# Patient Record
Sex: MALE | Race: WHITE | Employment: FULL TIME
[De-identification: names, ages, dates, MRNs, and addresses within clinical notes are randomized per-mention and may not be internally consistent; named-entity substitution may affect disease eponyms.]

---

## 2019-10-03 ENCOUNTER — HEALTH MAINTENANCE LETTER (OUTPATIENT)
Age: 39
End: 2019-10-03

## 2020-11-07 ENCOUNTER — HEALTH MAINTENANCE LETTER (OUTPATIENT)
Age: 40
End: 2020-11-07

## 2021-09-05 ENCOUNTER — HEALTH MAINTENANCE LETTER (OUTPATIENT)
Age: 41
End: 2021-09-05

## 2021-12-26 ENCOUNTER — HEALTH MAINTENANCE LETTER (OUTPATIENT)
Age: 41
End: 2021-12-26

## 2022-10-23 ENCOUNTER — HEALTH MAINTENANCE LETTER (OUTPATIENT)
Age: 42
End: 2022-10-23

## 2022-12-15 DIAGNOSIS — Z84.89 FAMILY HISTORY OF GENETIC DISEASE: Primary | ICD-10-CM

## 2022-12-15 NOTE — PROGRESS NOTES
Order placed for Ashok for familial variant testing. 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     ABCA4 c.1622T>C (p.Azg144Iff)   ABCA4 c.3113C>T (p.Tgf4609Xzb)  ABCA4 c.5512C>G (p.Fay5901Pnw)   EEW80Y0 c.-608_-489del    We discussed testing for these variants for Ashok to further aid in Dwain's genetic testing interpretation and to confirm carrier status. He will be mailed a buccal kit for sample collection and I will call with results 2-3 weeks after the lab receives his sample.     Shahnaz Villalta MS, [facility]  Licensed Genetic Counselor  [facility]  Phone: [phone]  Fax: [phone]

## 2023-04-02 ENCOUNTER — HEALTH MAINTENANCE LETTER (OUTPATIENT)
Age: 43
End: 2023-04-02

## 2024-06-02 ENCOUNTER — HEALTH MAINTENANCE LETTER (OUTPATIENT)
Age: 44
End: 2024-06-02

## 2024-08-07 NOTE — PROGRESS NOTES
AUDIOLOGY REPORT    SUMMARY: [facility] Audiology visit (baseline) completed. See audiogram for results.      RECOMMENDATIONS: Follow-up with Dr. Nicho Quinones. Recheck hearing if changes.     The patient expressed understanding and agreement with this plan.    Rashaad Lo, CCC-A, [facility]  Licensed Audiologist  [place] #[id number]

## 2024-08-12 ENCOUNTER — VIRTUAL VISIT (OUTPATIENT)
Dept: INTERNAL MEDICINE | Facility: CLINIC | Age: 44
End: 2024-08-12
Payer: COMMERCIAL

## 2024-08-12 DIAGNOSIS — Z00.00 VISIT FOR PREVENTIVE HEALTH EXAMINATION: ICD-10-CM

## 2024-08-12 DIAGNOSIS — Z00.00 ENCOUNTER FOR PREVENTIVE HEALTH EXAMINATION: Primary | ICD-10-CM

## 2024-08-12 PROCEDURE — 99207 PR NO CHARGE LOS: CPT | Mod: 93

## 2024-08-12 RX ORDER — ERGOCALCIFEROL (VITAMIN D2) 10 MCG
TABLET ORAL
COMMUNITY

## 2024-08-12 ASSESSMENT — ANXIETY QUESTIONNAIRES
GAD7 TOTAL SCORE: 0
2. NOT BEING ABLE TO STOP OR CONTROL WORRYING: NOT AT ALL
4. TROUBLE RELAXING: NOT AT ALL
GAD7 TOTAL SCORE: 0
3. WORRYING TOO MUCH ABOUT DIFFERENT THINGS: NOT AT ALL
7. FEELING AFRAID AS IF SOMETHING AWFUL MIGHT HAPPEN: NOT AT ALL
GAD7 TOTAL SCORE: 0
7. FEELING AFRAID AS IF SOMETHING AWFUL MIGHT HAPPEN: NOT AT ALL
1. FEELING NERVOUS, ANXIOUS, OR ON EDGE: NOT AT ALL
6. BECOMING EASILY ANNOYED OR IRRITABLE: NOT AT ALL
GAD7 TOTAL SCORE: 0
5. BEING SO RESTLESS THAT IT IS HARD TO SIT STILL: NOT AT ALL
4. TROUBLE RELAXING: NOT AT ALL
7. FEELING AFRAID AS IF SOMETHING AWFUL MIGHT HAPPEN: NOT AT ALL
3. WORRYING TOO MUCH ABOUT DIFFERENT THINGS: NOT AT ALL
2. NOT BEING ABLE TO STOP OR CONTROL WORRYING: NOT AT ALL
5. BEING SO RESTLESS THAT IT IS HARD TO SIT STILL: NOT AT ALL
6. BECOMING EASILY ANNOYED OR IRRITABLE: NOT AT ALL
7. FEELING AFRAID AS IF SOMETHING AWFUL MIGHT HAPPEN: NOT AT ALL
GAD7 TOTAL SCORE: 0
GAD7 TOTAL SCORE: 0
1. FEELING NERVOUS, ANXIOUS, OR ON EDGE: NOT AT ALL

## 2024-08-12 NOTE — PROGRESS NOTES
Health Maintenance:  Do you have a PCP? No  When was your last visit with your PCP?   When was your last eye exam? 10 years  Have you ever had a colonoscopy? No Flex sig  If yes, when? 3 years  Have you ever had any polyps removed? No    As part of your visit we will set up a DEXA scan which will measure your body composition. We have a few questions that need to be answered before we can schedule this scan:   What is your approximate weight? 170   Have you ever had a DEXA scan within the past 2 years? No   Will you have any other imaging studies with contrast (x-ray, CT scan) within 7 days of this appointment? No   Have you had any spine or hip surgery? No   Do you take any vitamins that contain calcium or antacids with calcium? Yes    If yes, stop taking 24 hours prior to visit.     Goals for the Visit:  Thorough Comprehensive Preventive Exam    Pertinent past Medical/Family and Social HX:   Pertinent sx that desire are addressed with this visit:     Answers submitted by the patient for this visit:  EDUARDO-7 (Submitted on 8/12/2024)  EDUARDO 7 TOTAL SCORE: 0    Instructions prior to appointment:   1. Fast beginning at 10 pm for lab appointment  2. If your preventive care assessment package includes a Fitness Assessment, please bring athletic shoes. Complementary [employer] [facility] fitness attire is provided and yours to keep.  3. If eye exam, eyes may be dilated, it will last 4-6 hours, may want to bring sunglasses.   4. May bring laptop or other work materials for use during downtime.   5. You will receive an email about 3 days prior to your visit with a final itinerary, menu selections for the complementary breakfast and lunch and instructions for the visit.     Complimentary  Parking provided. Drop off car in front of [facility], take the patient elevators to the [facility] clinic. When you enter in the lobby, identify yourself as an [facility] [atient and you  will be escorted up to the clinic.   If questions arise prior to your appointment please contact the clinic at [phone].

## 2024-08-15 ENCOUNTER — OFFICE VISIT (OUTPATIENT)
Dept: OPHTHALMOLOGY | Facility: CLINIC | Age: 44
End: 2024-08-15
Payer: COMMERCIAL

## 2024-08-15 ENCOUNTER — OFFICE VISIT (OUTPATIENT)
Dept: INTERNAL MEDICINE | Facility: CLINIC | Age: 44
End: 2024-08-15
Payer: COMMERCIAL

## 2024-08-15 ENCOUNTER — OFFICE VISIT (OUTPATIENT)
Dept: PULMONOLOGY | Facility: CLINIC | Age: 44
End: 2024-08-15
Payer: COMMERCIAL

## 2024-08-15 ENCOUNTER — OFFICE VISIT (OUTPATIENT)
Dept: AUDIOLOGY | Facility: CLINIC | Age: 44
End: 2024-08-15
Payer: COMMERCIAL

## 2024-08-15 ENCOUNTER — OFFICE VISIT (OUTPATIENT)
Dept: GASTROENTEROLOGY | Facility: CLINIC | Age: 44
End: 2024-08-15
Payer: COMMERCIAL

## 2024-08-15 ENCOUNTER — OFFICE VISIT (OUTPATIENT)
Dept: DERMATOLOGY | Facility: CLINIC | Age: 44
End: 2024-08-15
Payer: COMMERCIAL

## 2024-08-15 ENCOUNTER — ANCILLARY PROCEDURE (OUTPATIENT)
Dept: BONE DENSITY | Facility: CLINIC | Age: 44
End: 2024-08-15
Payer: COMMERCIAL

## 2024-08-15 ENCOUNTER — APPOINTMENT (OUTPATIENT)
Dept: INTERNAL MEDICINE | Facility: CLINIC | Age: 44
End: 2024-08-15
Payer: COMMERCIAL

## 2024-08-15 VITALS
WEIGHT: 170 LBS | RESPIRATION RATE: 16 BRPM | SYSTOLIC BLOOD PRESSURE: 125 MMHG | HEART RATE: 65 BPM | HEIGHT: 68 IN | DIASTOLIC BLOOD PRESSURE: 83 MMHG | TEMPERATURE: 97.7 F | OXYGEN SATURATION: 99 % | BODY MASS INDEX: 25.76 KG/M2

## 2024-08-15 DIAGNOSIS — Z00.00 ENCOUNTER FOR PREVENTIVE HEALTH EXAMINATION: ICD-10-CM

## 2024-08-15 DIAGNOSIS — K64.9 HEMORRHOIDS, UNSPECIFIED HEMORRHOID TYPE: Primary | ICD-10-CM

## 2024-08-15 DIAGNOSIS — Z00.00 VISIT FOR PREVENTIVE HEALTH EXAMINATION: ICD-10-CM

## 2024-08-15 DIAGNOSIS — Z12.83 SKIN CANCER SCREENING: ICD-10-CM

## 2024-08-15 DIAGNOSIS — D22.9 MULTIPLE MELANOCYTIC NEVI: ICD-10-CM

## 2024-08-15 DIAGNOSIS — Z01.00 EXAMINATION OF EYES AND VISION: Primary | ICD-10-CM

## 2024-08-15 DIAGNOSIS — Z71.82 EXERCISE COUNSELING: Primary | ICD-10-CM

## 2024-08-15 DIAGNOSIS — Z01.10 NORMAL HEARING EXAM: Primary | ICD-10-CM

## 2024-08-15 DIAGNOSIS — L21.9 DERMATITIS, SEBORRHEIC: Primary | ICD-10-CM

## 2024-08-15 DIAGNOSIS — Z00.00 ENCOUNTER FOR PREVENTIVE HEALTH EXAMINATION: Primary | ICD-10-CM

## 2024-08-15 DIAGNOSIS — R73.01 IMPAIRED FASTING GLUCOSE: ICD-10-CM

## 2024-08-15 LAB
ACANTHOCYTES BLD QL SMEAR: NORMAL
ALBUMIN UR-MCNC: NEGATIVE MG/DL
ALP SERPL-CCNC: 52 U/L (ref 40–150)
ALT SERPL W P-5'-P-CCNC: 19 U/L (ref 0–70)
APPEARANCE UR: CLEAR
ATRIAL RATE - MUSE: 63 BPM
AUER BODIES BLD QL SMEAR: NORMAL
BASO STIPL BLD QL SMEAR: NORMAL
BASOPHILS # BLD AUTO: 0 10E3/UL (ref 0–0.2)
BASOPHILS NFR BLD AUTO: 1 %
BILIRUB UR QL STRIP: NEGATIVE
BITE CELLS BLD QL SMEAR: NORMAL
BLISTER CELLS BLD QL SMEAR: NORMAL
BURR CELLS BLD QL SMEAR: NORMAL
CHOLEST SERPL-MCNC: 171 MG/DL
COLOR UR AUTO: ABNORMAL
CREAT SERPL-MCNC: 1.04 MG/DL (ref 0.67–1.17)
DACRYOCYTES BLD QL SMEAR: NORMAL
DIASTOLIC BLOOD PRESSURE - MUSE: NORMAL MMHG
EGFRCR SERPLBLD CKD-EPI 2021: 90 ML/MIN/1.73M2
ELLIPTOCYTES BLD QL SMEAR: NORMAL
EOSINOPHIL # BLD AUTO: 0.1 10E3/UL (ref 0–0.7)
EOSINOPHIL NFR BLD AUTO: 2 %
ERYTHROCYTE [DISTWIDTH] IN BLOOD BY AUTOMATED COUNT: 12.7 % (ref 10–15)
FASTING STATUS PATIENT QL REPORTED: ABNORMAL
FASTING STATUS PATIENT QL REPORTED: NORMAL
FRAGMENTS BLD QL SMEAR: NORMAL
GLUCOSE SERPL-MCNC: 109 MG/DL (ref 70–99)
GLUCOSE UR STRIP-MCNC: NEGATIVE MG/DL
HBV CORE AB SERPL QL IA: NONREACTIVE
HBV SURFACE AB SERPL IA-ACNC: <3.5 M[IU]/ML
HBV SURFACE AB SERPL IA-ACNC: NONREACTIVE M[IU]/ML
HCT VFR BLD AUTO: 43.3 % (ref 40–53)
HCV AB SERPL QL IA: NONREACTIVE
HDLC SERPL-MCNC: 61 MG/DL
HGB BLD-MCNC: 14 G/DL (ref 13.3–17.7)
HGB C CRYSTALS: NORMAL
HGB UR QL STRIP: NEGATIVE
HIV 1+2 AB+HIV1 P24 AG SERPL QL IA: NONREACTIVE
HOLD SPECIMEN: NORMAL
HOWELL-JOLLY BOD BLD QL SMEAR: NORMAL
IMM GRANULOCYTES # BLD: 0 10E3/UL
IMM GRANULOCYTES NFR BLD: 0 %
INTERPRETATION ECG - MUSE: NORMAL
KETONES UR STRIP-MCNC: NEGATIVE MG/DL
LDLC SERPL CALC-MCNC: 97 MG/DL
LEUKOCYTE ESTERASE UR QL STRIP: NEGATIVE
LYMPHOCYTES # BLD AUTO: 2.1 10E3/UL (ref 0.8–5.3)
LYMPHOCYTES NFR BLD AUTO: 43 %
MCH RBC QN AUTO: 28.5 PG (ref 26.5–33)
MCHC RBC AUTO-ENTMCNC: 32.3 G/DL (ref 31.5–36.5)
MCV RBC AUTO: 88 FL (ref 78–100)
MONOCYTES # BLD AUTO: 0.5 10E3/UL (ref 0–1.3)
MONOCYTES NFR BLD AUTO: 10 %
MUCOUS THREADS #/AREA URNS LPF: PRESENT /LPF
NEUTROPHILS # BLD AUTO: 2.1 10E3/UL (ref 1.6–8.3)
NEUTROPHILS NFR BLD AUTO: 44 %
NEUTS HYPERSEG BLD QL SMEAR: NORMAL
NITRATE UR QL: NEGATIVE
NONHDLC SERPL-MCNC: 110 MG/DL
NRBC # BLD AUTO: 0 10E3/UL
NRBC BLD AUTO-RTO: 0 /100
P AXIS - MUSE: 36 DEGREES
PH UR STRIP: 5.5 [PH] (ref 5–7)
PLAT MORPH BLD: NORMAL
PLATELET # BLD AUTO: 172 10E3/UL (ref 150–450)
POLYCHROMASIA BLD QL SMEAR: NORMAL
PR INTERVAL - MUSE: 200 MS
PSA SERPL DL<=0.01 NG/ML-MCNC: 0.68 NG/ML (ref 0–2.5)
QRS DURATION - MUSE: 86 MS
QT - MUSE: 422 MS
QTC - MUSE: 431 MS
R AXIS - MUSE: -11 DEGREES
RBC # BLD AUTO: 4.91 10E6/UL (ref 4.4–5.9)
RBC AGGLUT BLD QL: NORMAL
RBC MORPH BLD: NORMAL
RBC URINE: <1 /HPF
ROULEAUX BLD QL SMEAR: NORMAL
SICKLE CELLS BLD QL SMEAR: NORMAL
SMUDGE CELLS BLD QL SMEAR: NORMAL
SP GR UR STRIP: 1.02 (ref 1–1.03)
SPHEROCYTES BLD QL SMEAR: NORMAL
SQUAMOUS EPITHELIAL: <1 /HPF
STOMATOCYTES BLD QL SMEAR: NORMAL
SYSTOLIC BLOOD PRESSURE - MUSE: NORMAL MMHG
T AXIS - MUSE: 11 DEGREES
TARGETS BLD QL SMEAR: NORMAL
TOXIC GRANULES BLD QL SMEAR: NORMAL
TRIGL SERPL-MCNC: 65 MG/DL
TSH SERPL DL<=0.005 MIU/L-ACNC: 1.68 UIU/ML (ref 0.3–4.2)
UROBILINOGEN UR STRIP-MCNC: NORMAL MG/DL
VARIANT LYMPHS BLD QL SMEAR: NORMAL
VENTRICULAR RATE- MUSE: 63 BPM
VIT D+METAB SERPL-MCNC: 24 NG/ML (ref 20–50)
WBC # BLD AUTO: 4.9 10E3/UL (ref 4–11)
WBC URINE: <1 /HPF

## 2024-08-15 PROCEDURE — 92550 TYMPANOMETRY & REFLEX THRESH: CPT | Performed by: AUDIOLOGIST-HEARING AID FITTER

## 2024-08-15 PROCEDURE — 84075 ASSAY ALKALINE PHOSPHATASE: CPT | Performed by: PATHOLOGY

## 2024-08-15 PROCEDURE — 87389 HIV-1 AG W/HIV-1&-2 AB AG IA: CPT | Performed by: INTERNAL MEDICINE

## 2024-08-15 PROCEDURE — 92004 COMPRE OPH EXAM NEW PT 1/>: CPT | Performed by: OPTOMETRIST

## 2024-08-15 PROCEDURE — 99204 OFFICE O/P NEW MOD 45 MIN: CPT | Performed by: DERMATOLOGY

## 2024-08-15 PROCEDURE — 80061 LIPID PANEL: CPT | Performed by: PATHOLOGY

## 2024-08-15 PROCEDURE — 85025 COMPLETE CBC W/AUTO DIFF WBC: CPT | Performed by: PATHOLOGY

## 2024-08-15 PROCEDURE — 99000 SPECIMEN HANDLING OFFICE-LAB: CPT | Performed by: PATHOLOGY

## 2024-08-15 PROCEDURE — 84443 ASSAY THYROID STIM HORMONE: CPT | Performed by: PATHOLOGY

## 2024-08-15 PROCEDURE — 93000 ELECTROCARDIOGRAM COMPLETE: CPT | Performed by: INTERNAL MEDICINE

## 2024-08-15 PROCEDURE — 99207 PR NO CHARGE LOS: CPT

## 2024-08-15 PROCEDURE — 94375 RESPIRATORY FLOW VOLUME LOOP: CPT | Performed by: INTERNAL MEDICINE

## 2024-08-15 PROCEDURE — G0103 PSA SCREENING: HCPCS | Performed by: PATHOLOGY

## 2024-08-15 PROCEDURE — 86704 HEP B CORE ANTIBODY TOTAL: CPT | Performed by: INTERNAL MEDICINE

## 2024-08-15 PROCEDURE — 99207 PR NO BILLABLE SERVICE THIS VISIT: CPT | Performed by: DIETITIAN, REGISTERED

## 2024-08-15 PROCEDURE — 82565 ASSAY OF CREATININE: CPT | Performed by: PATHOLOGY

## 2024-08-15 PROCEDURE — 81001 URINALYSIS AUTO W/SCOPE: CPT | Performed by: PATHOLOGY

## 2024-08-15 PROCEDURE — 86706 HEP B SURFACE ANTIBODY: CPT | Performed by: INTERNAL MEDICINE

## 2024-08-15 PROCEDURE — 77080 DXA BONE DENSITY AXIAL: CPT | Performed by: INTERNAL MEDICINE

## 2024-08-15 PROCEDURE — 82947 ASSAY GLUCOSE BLOOD QUANT: CPT | Performed by: PATHOLOGY

## 2024-08-15 PROCEDURE — 96999 UNLISTED SPEC DERM SVC/PX: CPT | Performed by: INTERNAL MEDICINE

## 2024-08-15 PROCEDURE — 86803 HEPATITIS C AB TEST: CPT | Performed by: INTERNAL MEDICINE

## 2024-08-15 PROCEDURE — 99386 PREV VISIT NEW AGE 40-64: CPT | Performed by: INTERNAL MEDICINE

## 2024-08-15 PROCEDURE — 82306 VITAMIN D 25 HYDROXY: CPT | Performed by: INTERNAL MEDICINE

## 2024-08-15 PROCEDURE — 84460 ALANINE AMINO (ALT) (SGPT): CPT | Performed by: PATHOLOGY

## 2024-08-15 PROCEDURE — 92557 COMPREHENSIVE HEARING TEST: CPT | Performed by: AUDIOLOGIST-HEARING AID FITTER

## 2024-08-15 PROCEDURE — 36415 COLL VENOUS BLD VENIPUNCTURE: CPT | Performed by: PATHOLOGY

## 2024-08-15 RX ORDER — KETOCONAZOLE 20 MG/ML
SHAMPOO TOPICAL
Qty: 120 ML | Refills: 3 | Status: SHIPPED | OUTPATIENT
Start: 2024-08-15

## 2024-08-15 RX ORDER — TRIAMCINOLONE ACETONIDE 1 MG/ML
LOTION TOPICAL
Qty: 60 ML | Refills: 2 | Status: SHIPPED | OUTPATIENT
Start: 2024-08-15

## 2024-08-15 ASSESSMENT — CONF VISUAL FIELD
OS_INFERIOR_NASAL_RESTRICTION: 0
METHOD: COUNTING FINGERS
OD_NORMAL: 1
OD_INFERIOR_NASAL_RESTRICTION: 0
OS_INFERIOR_TEMPORAL_RESTRICTION: 0
OD_SUPERIOR_TEMPORAL_RESTRICTION: 0
OS_SUPERIOR_NASAL_RESTRICTION: 0
OS_NORMAL: 1
OS_SUPERIOR_TEMPORAL_RESTRICTION: 0
OD_INFERIOR_TEMPORAL_RESTRICTION: 0
OD_SUPERIOR_NASAL_RESTRICTION: 0

## 2024-08-15 ASSESSMENT — TONOMETRY
IOP_METHOD: ICARE
OD_IOP_MMHG: 13
OS_IOP_MMHG: 11

## 2024-08-15 ASSESSMENT — CUP TO DISC RATIO
OD_RATIO: 0.35
OS_RATIO: 0.35

## 2024-08-15 ASSESSMENT — SLIT LAMP EXAM - LIDS
COMMENTS: NORMAL
COMMENTS: NORMAL

## 2024-08-15 ASSESSMENT — VISUAL ACUITY
METHOD: SNELLEN - LINEAR
OD_CC+: -2
OS_CC: 20/15
OS_CC+: -2
OD_CC: 20/15

## 2024-08-15 ASSESSMENT — EXTERNAL EXAM - RIGHT EYE: OD_EXAM: NORMAL

## 2024-08-15 ASSESSMENT — PAIN SCALES - GENERAL
PAINLEVEL: NO PAIN (0)
PAINLEVEL: NO PAIN (0)

## 2024-08-15 ASSESSMENT — EXTERNAL EXAM - LEFT EYE: OS_EXAM: NORMAL

## 2024-08-15 NOTE — NURSING NOTE
Dermatology Rooming Note    Ashok Justice's goals for this visit include:   Chief Complaint   Patient presents with    Derm Problem     Had a FBSE a few years ago. Had an area removed on the right forearm. Skin tags on neck. Moles in the groin area.      Ethan Rey, EMT  Clinic Support  [facility]     [phone]    Employed by [employer]

## 2024-08-15 NOTE — PATIENT INSTRUCTIONS
It was nice meeting you today. Below are the nutrition recommendations we discussed at your visit.    Please let me know if you have any additional questions.    Nutrition Recommendations    Increase water to drink at least 64 oz (8 cups) or more per day.    Aim for eating at least 2-3 meals per day. If following an intermittent fasting pattern, recommend eating at least lunch and dinner and if going a long stretch between these 2 meals, have a planned snack/small meal in between these meals.    --okay to replace a meal with a protein drink and can have some vegetables and fruit, either on the side or blended into a protein drink.    --can make some lunch meals ahead of time over the weekend, so you can bring to work during the week for lunch.    --can bring dinner leftovers for lunch at work.    Can use the Plate method plan for general guidance on getting balanced meals and general portion sizes which is as follows:     Make half of your plate vegetables and fruit.      Make 1/4 of your plate lean protein sources at a meal (salmon/fish, skinless chicken/turkey breast, pork tenderloin, lean cuts of beef/93% or leaner beef, beans and legumes such as black/kidney/lemus beans/navy beans, tofu, edamame, tempeh, eggs, egg whites, lowfat cottage cheese, lowfat yogurt).     Make the other 1/4 of your plate at meals a whole grain starches/grains/starchy vegetables. Some examples include quinoa, brown rice, wild rice, barley, whole grain tortilla or starchy vegetables (potatoes, sweet potatoes, winter squash, peas, corn).     Include some healthy/unsaturated fat servings at a meal (olive oil, avocado oil, sesame oil, avocados, natural nut butters, nuts, seeds, olives, flaxseeds, olbo seeds).     *Note: Recommendation is to eat at least 2-3 serving per day of some good sources of calcium (such as lowfat dairy products, (low fat cottage cheese, lowfat yogurt, lowfat milk), tofu, salmon, sardines, kale, spinach, soybeans,  almonds or whey protein powder for example). Also figs, prunes and oranges have some calcium too.    Thank you,    Cinthia Yeh, MS, RD, LD

## 2024-08-15 NOTE — Clinical Note
Audiology evaluation completed: Normal hearing and normal middle ear function in both ears.   Please let me know if questions.  Thanks,  Rashaad Lo, CCC-A, [facility], [place] Licensed Audiologist [place] #[id number]

## 2024-08-15 NOTE — PROGRESS NOTES
A/P  1.) Encounter for eye exam  -Emmetropic, seeing well at distance and near without correction. Asymptomatic for presbyopia  -Dilated ocular health unremarkable OU  -Family hx retinal dystrophy (pt's father and son have it, early onset ~ 20 years). Macula normal OU today. He and his wife have been testing, only have 2 of 3 variations per pt. Would monitor only    Monitor 1-2 years comprehensive, sooner prn    I have confirmed the patient's CC, HPI and reviewed Past Medical History, Past Surgical History, Social History, Family History, Problem List, Medication List and agree with Tech note.     Laura Kaminski, ANDREIA NARANJOO FSLS

## 2024-08-15 NOTE — NURSING NOTE
Chief Complaint   Patient presents with    Physical     Patient is here for annual physical     Bridget Crisostomo CMA 7:24 AM on 8/15/2024

## 2024-08-15 NOTE — PATIENT INSTRUCTIONS
Ashok,    It was great to meet you at [facility]. I especially appreciate your curiosity and questions regarding analyzing your VO2max test, as most patients are not nearly so interested. Your VO2max is about as good it gets in the 95th percentile for men of your age group. Your  strength is in the 35th and 52nd percentiles for your right and left sides, respectively. If you change one thing about your routine, I recommend adding at least one day per week of strength training.    Strength training    I recommend equipment such as kettlebells, an adjustable dumbbell set, and a pull-up bar to use at home. Make sure to get a kettlebell that's not too light, as this lets you overuse your arms and you will fail to develop the proper hip drive. Around 16-20 kg is probably a good starting point.    In addition to a dumbbell set, I recommend a pull-up bar because pulling exercises are often more difficult without gym machines. Be sure to fully secure any pull-up bar.    An adjustable dumbbell set isn't necessary, but it saves quite a bit of space.    Cardiovascular exercise    As stated, your VO2max is fantastic, so if you add cardio I recommend adding moderate or somewhat hard cardio.    Moderate cardio: heart rate 115-130. Go for as long as you like, because you can more or less do this for hours at a time.    Somewhat hard cardio: heart rate 145-155. Go for 20-30 minutes. This should be hard but doable.      Movement during the workday    We didn't have time to cover this, but it's worth mentioning. Recent research has shown that short movement breaks of about 5 minutes at the intensity of a 2 mph walk are far superior to standing. Your blood pressure and blood glucose and insulin control improve even if you take a 5-minute walking break from sitting every 2 hours. You'll receive even greater benefits if you increase this to once every hour and even more if you do this every half hour. Standing instead of some  sitting does something, but it's far better to get up and walk away for a few minutes. Plus, the same research shows that productivity is the same or better with these breaks.      Please reach out with any questions.     Sincerely,    Ancelmo Suggs  PhD, Exercise Physiology

## 2024-08-15 NOTE — NURSING NOTE
AHA BP    1st   131/86  2nd  126/85  3rd   125/83    Average  128/85  Bridget Crisostomo CMA 8:28 AM on 8/15/2024

## 2024-08-15 NOTE — NURSING NOTE
Chief Complaints and History of Present Illnesses   Patient presents with    Annual Eye Exam     Chief Complaint(s) and History of Present Illness(es)       Annual Eye Exam              Laterality: both eyes              Comments    Patient is unsure when his last eye exam was, probably greater than 20 years ago.  Vision seems clear without glasses.  No problems with near vision.  Dad and son have macular degeneration.  Son was diagnosed when he was 9 or 10 years old and sees Dr. Lainez.  No history of diabetes.  Denies flashes or floaters.  Denies eye pain.     Sandra Baker on 8/15/2024 at 8:23 AM

## 2024-08-15 NOTE — PROGRESS NOTES
"[facility] Outpatient Medical Nutrition Therapy      Time Spent:  55 minutes  Session Type:  Initial   Referral Source: [facility]/Dr. Nicho Quinones  Reason for RD Visit:   Nutritional counseling     Nutrition Assessment:    Patient is a 43 y.o. male who is here for initial annual visit with Registered Dietitian (RD). He stated that overall, he feels that he eats a healthy diet. His wife is [place] American so they eat a lot of Japanese dishes at home for dinner. He tends to skip breakfast and lunch many times during the week. He read some about intermittent fasting but also doesn't always want to stop work to go get lunch and also doesn't want to spend money purchasing lunch. At times he brings some snacks to work such as sardines, almonds and pretzels. He doesn't snack daily though. At work, he sips on his mug of americano coffee. He occasionally gets a sparkling water in the afternoon at work but stated the water at work is not good, so doesn't tend to drink water there. He tends to drink his water after work and estimated drinking 32-48 oz water per day. He has a large 64 oz water container that he sometimes brings to work but not regularly. He also has an option to infuse his water with fruit for some flavor.     Patient Active Problem List   Diagnosis    Constipated    CARDIOVASCULAR SCREENING; LDL GOAL LESS THAN 160     Estimated body mass index is 25.85 kg/m  as calculated from the following:    Height as of an earlier encounter on 8/15/24: 1.727 m (5' 8\").    Weight as of an earlier encounter on 8/15/24: 77.1 kg (170 lb).    Diet Recall:  (some usual meals, snacks and beverages):  Meal Food    Breakfast Skips during the week, if hungry then has yogurt or bread and butter  or bread with cheese and meat OR on weekends eats breakfast with kids: kodiak pancakes, eggs, hash browns or omelet   Lunch Skips or 1x/week has taco bell (blk bean burrito) or ~3x/week may just be a snack (see below) " "  Dinner Many japanese dishes: rice bowl with vegetables and meat or chicken, potato, princess chi sauteed over rice or thinly sliced beef with sauteed onions over sushi rice or pasta dishes (spag and meatballs or pasta with tomato sauce and sausage or brown rice bowl with beans and veggies or japanese pasta with peppers, tomatoes and ketchup or on weekends: smoked brisket/smoked/grilled meat with sathya slaw/vegetables   Snacks ~3x/week may have almonds or sardines or pretzels   Beverages Sips in the morning on a Mug of Americano made with 2 shots espresso and added water, after work at home: has 32-48 oz water, occasionally in afternoon a sparkling water, occas tea or gatorade   Alcohol Intake ~5-6 times per week has 1-2 drinks (either lite beer, wine, cocktail)     Frequency of eating/taking out meals: ~1x/week gets taco bell burrito and 1x or less eats/takes out for dinner with family. In winter with kids' sports may eat/take out more dinner meals.     Labs:    Last Comprehensive Metabolic Panel:  Glucose   Date Value Ref Range Status   08/15/2024 109 (H) 70 - 99 mg/dL Final     Creatinine   Date Value Ref Range Status   08/15/2024 1.04 0.67 - 1.17 mg/dL Final     GFR Estimate   Date Value Ref Range Status   08/15/2024 90 >60 mL/min/1.73m2 Final     Comment:     eGFR calculated using 2021 CKD-EPI equation.       CBC RESULTS: No results for input(s): \"WBC\", \"RBC\", \"HGB\", \"HCT\", \"MCV\", \"MCH\", \"MCHC\", \"RDW\", \"PLT\" in the last 35652 hours.    Pertinent Medications/vitamin and mineral supplements:     Current Outpatient Medications   Medication Sig Dispense Refill    acetaminophen (TYLENOL) 325 MG tablet Take 325-650 mg by mouth every 6 hours as needed.      Vitamin D, Cholecalciferol, 10 MCG (400 UNIT) TABS        No current facility-administered medications for this visit.     Food Allergies:  NKFA     Physical Activity:  2x/week, plays hockey. Otherwise on weekends does 4-5 hours of yard work    Nutrition Prescription: " Recommended general healthful diet     Nutrition Intervention:    Nutrition Education/Counseling:  -Provided general healthful diet nutrition education with tips and suggestions.   -Reviewed the Plate method meal plan with food groups and some example foods in groups.   -Encourage his to eat at least 2-3 meals per day. Discussed tips for intermittent fasting, if following the eating pattern.  -Reviewed the difference between unsaturated and saturated fats with recommendation to aim for choosing unsaturated fat over saturated fats and discussed examples of both.  -Encouraged patient to eat adequate fruit and vegetable servings per day (at least 5 servings but explained recommendation to aim for 9-11 servings per day).   -Discussed adequate hydration/recommendations and encouraged pt to drink at least 64 oz (8 cups) per day.     Answered patient's questions. Patient verbalized understanding of education provided.     Educational Materials Provided:   Health Daily Nutrition Plate method handout     Goals:    Increase water to drink at least 64 oz (8 cups) or more per day.    Aim for eating at least 2-3 meals per day. If following an intermittent fasting pattern, recommend eating at least lunch and dinner and if going a long stretch between these 2 meals, have a planned snack/small meal in between these meals.    --okay to replace a meal with a protein drink and can have some vegetables and fruit, either on the side or blended into a protein drink.    --can make some lunch meals ahead of time over the weekend, so you can bring to work during the week for lunch.    --can bring dinner leftovers for lunch at work.    Can use the Plate method plan for general guidance on getting balanced meals and general portion sizes which is as follows:     Make half of your plate vegetables and fruit.      Make 1/4 of your plate lean protein sources at a meal (salmon/fish, skinless chicken/turkey breast, pork tenderloin, lean cuts of  beef/93% or leaner beef, beans and legumes such as black/kidney/lemus beans/navy beans, tofu, edamame, tempeh, eggs, egg whites, lowfat cottage cheese, lowfat yogurt).     Make the other 1/4 of your plate at meals a whole grain starches/grains/starchy vegetables. Some examples include quinoa, brown rice, wild rice, barley, whole grain tortilla or starchy vegetables (potatoes, sweet potatoes, winter squash, peas, corn).     Include some healthy/unsaturated fat servings at a meal (olive oil, avocado oil, sesame oil, avocados, natural nut butters, nuts, seeds, olives, flaxseeds, lobo seeds).     *Note: Recommendation is to eat at least 2-3 serving per day of some good sources of calcium (such as lowfat dairy products, (low fat cottage cheese, lowfat yogurt, lowfat milk), tofu, salmon, sardines, kale, spinach, soybeans, almonds or whey protein powder for example). Also figs, prunes and oranges have some calcium too.    Nutrition Monitoring and Evaluation: Will monitor adherence to nutrition recommendations at future RD visits.     Further Medical Nutrition Therapy:  Annual visits    Patient was encouraged to call/contact RD with any further questions.    Cinthia Yeh, MS, RD, LD

## 2024-08-15 NOTE — PROGRESS NOTES
Ashok Justice comes into clinic today at the request of Dr. Nicho Quinones Ordering Provider for EKG.    This service provided today was under the supervising provider of the day Dr. Nicho Quinones, who was available if needed.    Bridget Hobson, CMA

## 2024-08-15 NOTE — LETTER
8/15/2024       RE: Ashok Justice  [address] [facility] [address]     Dear Colleague,    Thank you for referring your patient, Ashok Justice, to the [facility] [place] [facility]. Please see a copy of my visit note below.    Dermatology [facility] new patient visit  August 15, 2024    Assessment and plan:  1.  Overall benign skin cancer screening exam.  He was reassured regarding his benign findings.  We discussed the ABCDEs of melanoma as well as the signs and symptoms of nonmelanoma skin cancer.  We also reviewed our approach to some protective behaviors, including high SPF sunscreens.  He is quite cautious about sun exposure, as he burns easily.  2.  Benign-appearing nevi on the back and in the left inguinal fold.  Reassurance was provided as to the benign appearance of these lesions today.  3.  Seborrheic dermatitis, affecting the right nasolabial fold, outer ear and posterior scalp.  We gave him prescriptions for both ketoconazole shampoo 2% to use as a foaming face wash in the shower 2-3 times weekly as well as triamcinolone lotion 0.1%, to be applied on a Q-tip and swabbed and the outer part of the ear once to twice daily as needed.    We recommended he follow-up in clinic for a full body skin check in approximately 2 to 3 years time.      Jeffrey Stanton MD  Dermatology Attending    _________________________________________    Chief complaint: Skin cancer screening exam    History of present illness:  Ashok is a very pleasant 43-year-old male who is a new patient to our clinic today, presenting for a skin cancer screening exam as part of a [facility] visit.  He has no personal history of skin cancer, melanoma or otherwise.  He denies any new or changing moles and does not report any nonhealing sores.  He does have some recurring redness and rash on his face and in the outer parts of his  ears.      Physical exam:  General: Well-appearing, no apparent distress  Skin: A cutaneous exam of the head, neck, chest, abdomen, back, bilateral upper and lower extremities and buttocks was performed.  On the back, there are scattered 2 to 4 mm generally uniformly pigmented brown macules and flattopped papules consistent with benign nevi.  In the left inguinal fold, there is a 4 mm light brown well marginated flattopped papule.  In the nasolabial folds, posterior scalp and outer ears, there is faint pink erythema and fine scaling.      Again, thank you for allowing me to participate in the care of your patient.      Sincerely,    Jeffrey Stanton MD

## 2024-08-15 NOTE — OUTPATIENT NURSE NOTE
08/15/24 1007   Fitness   Current Fitness Regimen:  Exercise: competitive hockey games 2 d/wk. Physical activity: moderate yard work on weekends, mostly sedentary during work week.   Fitness Goals Fitness for longevity   Timeline   Recommended Activity this Week Continue current routine, investigate purchasing kettlebell, adjustable dumbbell set, and pull-up bar   Recommended Minutes per Day this Week 60 Min   Recommended Number of Days this Week 2 Per Day/Per Week   Recommended Activity this Month As above, but add lifting 1+ d/wk   Recommended Duration this Month 60 Min/Hrs   Recommended Frequency this Month 3 Per Day/Per Week   Recommended Activity the Nex 3 Months As above, but add moderate cardio 1+ d/wk   Recommended Duration the Nex 3 Months 60 Min/Hrs   Recommended Frequency the Nex 3 Months:  4 Per Day/Per Week   VO2 Max   VO2MAX:  52.8 ml/kg/min   VO2- max Percentile 95 %   Fitness Level Superior    Strength    Strength (Right):  101 ml/kg/min    Strength (Left) 102 ml/kg/min

## 2024-08-15 NOTE — PROGRESS NOTES
Dermatology [facility] new patient visit  August 15, 2024    Assessment and plan:  1.  Overall benign skin cancer screening exam.  He was reassured regarding his benign findings.  We discussed the ABCDEs of melanoma as well as the signs and symptoms of nonmelanoma skin cancer.  We also reviewed our approach to some protective behaviors, including high SPF sunscreens.  He is quite cautious about sun exposure, as he burns easily.  2.  Benign-appearing nevi on the back and in the left inguinal fold.  Reassurance was provided as to the benign appearance of these lesions today.  3.  Seborrheic dermatitis, affecting the right nasolabial fold, outer ear and posterior scalp.  We gave him prescriptions for both ketoconazole shampoo 2% to use as a foaming face wash in the shower 2-3 times weekly as well as triamcinolone lotion 0.1%, to be applied on a Q-tip and swabbed and the outer part of the ear once to twice daily as needed.    We recommended he follow-up in clinic for a full body skin check in approximately 2 to 3 years time.      Jeffrey Stanton MD  Dermatology Attending    _________________________________________    Chief complaint: Skin cancer screening exam    History of present illness:  Ashok is a very pleasant 43-year-old male who is a new patient to our clinic today, presenting for a skin cancer screening exam as part of a [facility] visit.  He has no personal history of skin cancer, melanoma or otherwise.  He denies any new or changing moles and does not report any nonhealing sores.  He does have some recurring redness and rash on his face and in the outer parts of his ears.      Physical exam:  General: Well-appearing, no apparent distress  Skin: A cutaneous exam of the head, neck, chest, abdomen, back, bilateral upper and lower extremities and buttocks was performed.  On the back, there are scattered 2 to 4 mm generally uniformly pigmented brown macules and flattopped papules consistent with  benign nevi.  In the left inguinal fold, there is a 4 mm light brown well marginated flattopped papule.  In the nasolabial folds, posterior scalp and outer ears, there is faint pink erythema and fine scaling.

## 2024-08-15 NOTE — LETTER
8/15/2024       RE: Ashok Justice  [address] [address]     Dear Colleague,    Thank you for referring your patient, Ashok Justice, to the [facility] at [facility]. Please see a copy of my visit note below.    History and Physical Examination     SUBJECTIVE: Chief concern: preventive health review.     Past Medical History:  1.  Carrier of macular degeneration gene without disease manifestation.  2.  History of allergies and asthma in adolescence; resolved with desensitization injections.  3.  History of hemorrhoid, treated with banding     Adverse Drug Reactions: None.     Current Medications:  Vitamin D3, 50 mcg daily; use regularly during winter months and sporadically during summer months.     Habits:  Tobacco: Never  Alcohol: 1-2 servings, 5 days/week  Caffeine: 2 servings of coffee per day with occasional serving of green tea  Cannabis/Street drugs: None     Social History:  to Bobo, a native of [place] and father of 2 sons: Juan, age 14, who will enter eighth grade and enjoys hockey; and Dwain, age 11, who will enter sixth grade and enjoys hockey.  Ashok is a [place] native who graduated from the [employer] with degrees in [employer] and computer science; he met his wife while studying abroad in [place].  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  Away from work, he enjoys family activities, playing hockey, and [employer] gardening.  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     Family History:  "Father is 72, with history of prostate cancer, diagnosed at age 78, and macular degeneration, diagnosed in his 20s.  Mother is 70, with history of myocardial infarction at age 67, hypertension, and unspecified \"low-grade blood cancer\", possibly CLL.  A brother 1 year his [place] has hypertension and dyslipidemia.  A brother 2 years his [place] is in good health.  Younger son has macular degeneration.  Maternal grandmother  at age 84, with history of late-onset dementia.  Maternal grandfather  at age 63 from myocardial infarction and was first diagnosed with coronary artery disease in his 50s.  Paternal grandmother  in her late 60s to early 70s with longstanding mental disabilities presumably related to meningitis.  Paternal grandfather  at age 92, with history of late-onset dementia.       Review of Systems: Ashok noted an episode of diarrhea approximately 6 weeks ago, accompanied by URI symptoms suggestive of COVID; diarrhea subsided after 1 week with use of Pepto-Bismol.  Flexible sigmoidoscopy was completed in  for evaluation of hemorrhoids.  COVID vaccinations administered in 3/2021, 2021, 2021, 2022, and 2023.  Tetanus (Tdap) vaccination was administered in 2021.  He is uncertain whether he has completed a hepatitis B vaccination series.  Remainder of complete review of systems was negative.     OBJECTIVE:     Vital signs: Height 68 inches.  Weight 170 pounds.  Blood pressure 128/85 on average of 3 automated readings.  Heart rate 65.  Respiratory rate 16.  Temperature 97.7 degrees.  O2 saturation 99% on room air.  General: Alert, neatly dressed and groomed, in no acute distress.  HEENT: Atraumatic and normocephalic. Eyelids, pupils, and conjunctivae appeared normal. Lips, teeth and gums appear normal.  Oropharynx showed moist mucous membranes, without exudate or erythema.  Neck: Supple, without thyromegaly, mass, or bruit. No cervical or supraclavicular lymphadenopathy.  Back: No " spinal or costovertebral angle tenderness.  Chest: Clear to auscultation and percussion. Normal respiratory effort.  Cardiovascular: No jugular venous distention. Regular rate and rhythm, normal S1, S2 without murmur.  Abdomen: Bowel sounds positive; soft, nontender, without rebound, guarding, hepatosplenomegaly or mass.  Extremities: No cyanosis or edema.  Genitalia: Normal male genitalia, without scrotal mass or hernia. No inguinal lymphadenopathy.  Rectal: Normal tone, with smooth, nontender, nonenlarged prostate. No rectal mass appreciated.  Skin: Examination was deferred; full evaluation was completed earlier in the day through dermatology clinic.  Neurologic: Cranial nerves II-XII were grossly intact. Sensory and motor examinations were normal. Normal gait.  Mini-Cog score was 4/5.  Psychiatric: Alert and oriented ×3. Normal affect. Judgment and insight intact.  PHQ-2 score was 0.  EDUARDO-normal 7 score was 0.    Creatinine 1.04, alkaline phosphatase 52, ALT 19, cholesterol 171, HDL 61, LDL 97, triglycerides 65, cholesterol/HDL 2.8, glucose 109, PSA 0.68, TSH 1.68, 25-hydroxy vitamin D 24, white blood cell count 4900, hemoglobin 14.0, platelets 172,000, urinalysis unremarkable, hepatitis B core antibody nonreactive, hepatitis B surface antibody nonreactive, hepatitis C antibody nonreactive, HIV nonreactive.    EKG was unremarkable.  Spirometry showed an FEV1 of 4.16, with an FVC of 4.89; readings were 117% and 111% of predicted values, respectively.    An audiogram showed normal hearing bilaterally.    DEXA showed normal bone density, with most negative and valid Z-score of -1.7 at the lumbar spine.    Body composition analysis showed 15.2% fat (6th percentile).  Body mass index was 25.85.     ASSESSMENT:    1.  Impaired fasting glucose.  We discussed implications of this diagnosis, along with the importance of continued regular exercise, maintenance of ideal weight, and adheres to a modified diet, the elements  of which were reviewed in detail.  I recommended measurement of fasting glucose levels at least annually.    2.  History of hemorrhoids.  Intermittent prolapsing of presumed internal hemorrhoid.  He expresses interest in further evaluation and management.  Colorectal surgery consultation will be arranged.    3.  Elevated blood pressure.  I will recommend that he measure and record home blood pressure readings for review with his usual physician if average reading exceeds 130/85.  He will be advised of lifestyle measures that can help reduce blood pressure.    4.  Preventive care.  Using the AHA PREVENT algorithm, his estimated 10-year/30-year risks are as follows: cardiovascular disease, 1.7% / 12.5%; ASCVD 1.1% / 7.4%; and heart failure 0.7% / 6.5%.  I recommended screening colonoscopy, annual influenza vaccination, and COVID vaccinations per CDC guidelines.  With results of hepatitis B screening showed no evidence of immunity, he will be advised to pursue hepatitis B vaccination series.  We reviewed the elements of the healthful diet, the roles of various types of exercise, including the time economy associate with high-intensity interval training.  I recommended continued use of supplemental vitamin D3, 50 mcg daily, while maintaining daily calcium intake of 1200 mg, preferably from dietary sources.  We reviewed the debate regarding the benefit of multivitamin supplements and the importance of selecting a product that does not contain iron should he choose to proceed.     PLAN: See above.     ~SRT      Again, thank you for allowing me to participate in the care of your patient.      Sincerely,    Nicho Quinones MD

## 2024-08-16 LAB
EXPTIME-PRE: 7.14 SEC
FEF2575-%PRED-PRE: 138 %
FEF2575-PRE: 4.77 L/SEC
FEF2575-PRED: 3.45 L/SEC
FEFMAX-%PRED-PRE: 96 %
FEFMAX-PRE: 9.27 L/SEC
FEFMAX-PRED: 9.56 L/SEC
FEV1-%PRED-PRE: 117 %
FEV1-PRE: 4.16 L
FEV1FEV6-PRE: 85 %
FEV1FEV6-PRED: 81 %
FEV1FVC-PRE: 85 %
FEV1FVC-PRED: 81 %
FIFMAX-PRE: 5.89 L/SEC
FVC-%PRED-PRE: 111 %
FVC-PRE: 4.89 L
FVC-PRED: 4.39 L

## 2024-08-16 NOTE — PROGRESS NOTES
"History and Physical Examination     SUBJECTIVE: Chief concern: preventive health review.     Past Medical History:  1.  Carrier of macular degeneration gene without disease manifestation.  2.  History of allergies and asthma in adolescence; resolved with desensitization injections.  3.  History of hemorrhoid, treated with banding     Adverse Drug Reactions: None.     Current Medications:  Vitamin D3, 50 mcg daily; use regularly during winter months and sporadically during summer months.     Habits:  Tobacco: Never  Alcohol: 1-2 servings, 5 days/week  Caffeine: 2 servings of coffee per day with occasional serving of green tea  Cannabis/Street drugs: None     Social History:  to Bobo, a native of [place] and father of 2 sons: Juan, age 14, who will enter eighth grade and enjoys hockey; and Dwain, age 11, who will enter sixth grade and enjoys hockey.  Ashok is a [place] native who graduated from the [employer] with degrees in [employer] and [employer] science; he met his wife while studying abroad in [place].  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  Away from work, he enjoys family activities, playing hockey, and [employer] gardening.  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     Family History: Father is 72, with history of prostate cancer, diagnosed at age 78, and macular degeneration, diagnosed in his 20s.  Mother is 70, with history of myocardial infarction at age 67, hypertension, and unspecified \"low-grade blood cancer\", possibly CLL.  A brother 1 year his [place] has hypertension and dyslipidemia.  A brother 2 " years his [place] is in good health.  Younger son has macular degeneration.  Maternal grandmother  at age 84, with history of late-onset dementia.  Maternal grandfather  at age 63 from myocardial infarction and was first diagnosed with coronary artery disease in his 50s.  Paternal grandmother  in her late 60s to early 70s with longstanding mental disabilities presumably related to meningitis.  Paternal grandfather  at age 92, with history of late-onset dementia.       Review of Systems: Ashok noted an episode of diarrhea approximately 6 weeks ago, accompanied by URI symptoms suggestive of COVID; diarrhea subsided after 1 week with use of Pepto-Bismol.  Flexible sigmoidoscopy was completed in  for evaluation of hemorrhoids.  COVID vaccinations administered in 3/2021, 2021, 2021, 2022, and 2023.  Tetanus (Tdap) vaccination was administered in 2021.  He is uncertain whether he has completed a hepatitis B vaccination series.  Remainder of complete review of systems was negative.     OBJECTIVE:     Vital signs: Height 68 inches.  Weight 170 pounds.  Blood pressure 128/85 on average of 3 automated readings.  Heart rate 65.  Respiratory rate 16.  Temperature 97.7 degrees.  O2 saturation 99% on room air.  General: Alert, neatly dressed and groomed, in no acute distress.  HEENT: Atraumatic and normocephalic. Eyelids, pupils, and conjunctivae appeared normal. Lips, teeth and gums appear normal.  Oropharynx showed moist mucous membranes, without exudate or erythema.  Neck: Supple, without thyromegaly, mass, or bruit. No cervical or supraclavicular lymphadenopathy.  Back: No spinal or costovertebral angle tenderness.  Chest: Clear to auscultation and percussion. Normal respiratory effort.  Cardiovascular: No jugular venous distention. Regular rate and rhythm, normal S1, S2 without murmur.  Abdomen: Bowel sounds positive; soft, nontender, without rebound, guarding, hepatosplenomegaly or  mass.  Extremities: No cyanosis or edema.  Genitalia: Normal male genitalia, without scrotal mass or hernia. No inguinal lymphadenopathy.  Rectal: Normal tone, with smooth, nontender, nonenlarged prostate. No rectal mass appreciated.  Skin: Examination was deferred; full evaluation was completed earlier in the day through dermatology clinic.  Neurologic: Cranial nerves II-XII were grossly intact. Sensory and motor examinations were normal. Normal gait.  Mini-Cog score was 4/5.  Psychiatric: Alert and oriented ×3. Normal affect. Judgment and insight intact.  PHQ-2 score was 0.  EDUARDO-normal 7 score was 0.    Creatinine 1.04, alkaline phosphatase 52, ALT 19, cholesterol 171, HDL 61, LDL 97, triglycerides 65, cholesterol/HDL 2.8, glucose 109, PSA 0.68, TSH 1.68, 25-hydroxy vitamin D 24, white blood cell count 4900, hemoglobin 14.0, platelets 172,000, urinalysis unremarkable, hepatitis B core antibody nonreactive, hepatitis B surface antibody nonreactive, hepatitis C antibody nonreactive, HIV nonreactive.    EKG was unremarkable.  Spirometry showed an FEV1 of 4.16, with an FVC of 4.89; readings were 117% and 111% of predicted values, respectively.    An audiogram showed normal hearing bilaterally.    DEXA showed normal bone density, with most negative and valid Z-score of -1.7 at the lumbar spine.    Body composition analysis showed 15.2% fat (6th percentile).  Body mass index was 25.85.     ASSESSMENT:    1.  Impaired fasting glucose.  We discussed implications of this diagnosis, along with the importance of continued regular exercise, maintenance of ideal weight, and adheres to a modified diet, the elements of which were reviewed in detail.  I recommended measurement of fasting glucose levels at least annually.    2.  History of hemorrhoids.  Intermittent prolapsing of presumed internal hemorrhoid.  He expresses interest in further evaluation and management.  Colorectal surgery consultation will be arranged.    3.   Elevated blood pressure.  I will recommend that he measure and record home blood pressure readings for review with his usual physician if average reading exceeds 130/85.  He will be advised of lifestyle measures that can help reduce blood pressure.    4.  Preventive care.  Using the AHA PREVENT algorithm, his estimated 10-year/30-year risks are as follows: cardiovascular disease, 1.7% / 12.5%; ASCVD 1.1% / 7.4%; and heart failure 0.7% / 6.5%.  I recommended screening colonoscopy, annual influenza vaccination, and COVID vaccinations per CDC guidelines.  With results of hepatitis B screening showed no evidence of immunity, he will be advised to pursue hepatitis B vaccination series.  We reviewed the elements of the healthful diet, the roles of various types of exercise, including the time economy associate with high-intensity interval training.  I recommended continued use of supplemental vitamin D3, 50 mcg daily, while maintaining daily calcium intake of 1200 mg, preferably from dietary sources.  We reviewed the debate regarding the benefit of multivitamin supplements and the importance of selecting a product that does not contain iron should he choose to proceed.     PLAN: See above.     ~SRT

## 2024-09-08 PROBLEM — D22.9 MULTIPLE MELANOCYTIC NEVI: Status: ACTIVE | Noted: 2024-09-08

## 2024-09-08 PROBLEM — L21.9 DERMATITIS, SEBORRHEIC: Status: ACTIVE | Noted: 2024-09-08

## 2024-09-08 PROBLEM — Z12.83 SKIN CANCER SCREENING: Status: ACTIVE | Noted: 2024-09-08

## 2025-04-22 ENCOUNTER — OFFICE VISIT (OUTPATIENT)
Dept: URGENT CARE | Facility: URGENT CARE | Age: 45
End: 2025-04-22
Payer: COMMERCIAL

## 2025-04-22 VITALS
OXYGEN SATURATION: 97 % | HEART RATE: 78 BPM | DIASTOLIC BLOOD PRESSURE: 83 MMHG | SYSTOLIC BLOOD PRESSURE: 145 MMHG | TEMPERATURE: 98 F | RESPIRATION RATE: 20 BRPM
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
## 2025-04-22 DIAGNOSIS — S61.258A DOG BITE OF INDEX FINGER, INITIAL ENCOUNTER: Primary | ICD-10-CM

## 2025-04-22 PROCEDURE — 3077F SYST BP >= 140 MM HG: CPT | Performed by: PHYSICIAN ASSISTANT

## 2025-04-22 PROCEDURE — 3079F DIAST BP 80-89 MM HG: CPT | Performed by: PHYSICIAN ASSISTANT

## 2025-04-22 PROCEDURE — 99213 OFFICE O/P EST LOW 20 MIN: CPT | Performed by: PHYSICIAN ASSISTANT

## 2025-04-22 NOTE — PATIENT INSTRUCTIONS
Patient was educated on the natural course of injury.  Keep wound dry and clean. Wash area with soap and water. Watch for signs of infection such as redness or purulent drainage. Conservative measures discussed including over-the-counter Tylenol as needed for pain. See your primary care provider in 3 days if there is no improvement or sooner as needed. Seek emergency care if you develop fever, streaking, severe pain or rapidly spreading redness.

## 2025-04-22 NOTE — PROGRESS NOTES
URGENT CARE VISIT:    SUBJECTIVE:   Ashok Justice is a 44 year old male who presents with a chief complaint of an animal bite on the right index finger.  He was bitten by a dog 5 days ago. Severity: bite with skin break.  Animal's immunizations up to date  Associated symptoms include redness noted. Tetanus status: last tetanus booster within 10 years    PMH:   Past Medical History:   Diagnosis Date    Uncomplicated asthma 1988    Hasn't really affected me for more than 20 years     Allergies: Patient has no known allergies.   Medications:   Current Outpatient Medications   Medication Sig Dispense Refill    amoxicillin-clavulanate (AUGMENTIN) 875-125 MG tablet Take 1 tablet by mouth 2 times daily for 7 days. 14 tablet 0    ketoconazole (NIZORAL) 2 % external shampoo Use as foaming face wash in shower two to three times weekly (Patient not taking: Reported on 4/22/2025) 120 mL 3    triamcinolone (KENALOG) 0.1 % external lotion Apply several drops to a q-tip and swab outer ears once to twice daily as needed (Patient not taking: Reported on 4/22/2025) 60 mL 2    Vitamin D, Cholecalciferol, 10 MCG (400 UNIT) TABS  (Patient not taking: Reported on 4/22/2025)       Social History:   Social History     Tobacco Use    Smoking status: Never    Smokeless tobacco: Never   Substance Use Topics    Alcohol use: Yes     Comment: five or six drinks per week       ROS:  Review of systems negative except as stated above.    OBJECTIVE:  BP (!) 145/83   Pulse 78   Temp 98  F (36.7  C)   Resp 20   SpO2 97%   GENERAL: healthy, alert no acute distress  SKIN: puncture wound of right index finger with surrounding erythema and edema  MS:extremities normal- no gross deformities noted,  FROM noted in all extremities  NEURO: Normal strength and tone, sensory exam grossly normal,  normal speech and mentation      ASSESSMENT:    ICD-10-CM    1. Dog bite of index finger, initial encounter  S61.258A amoxicillin-clavulanate (AUGMENTIN) 875-125  MG tablet    W54.0XXA           PLAN:  Patient Instructions   Patient was educated on the natural course of injury.  Keep wound dry and clean. Wash area with soap and water. Watch for signs of infection such as redness or purulent drainage. Conservative measures discussed including over-the-counter Tylenol as needed for pain. See your primary care provider in 3 days if there is no improvement or sooner as needed. Seek emergency care if you develop fever, streaking, severe pain or rapidly spreading redness.     Patient verbalized understanding and is agreeable to plan. The patient was discharged ambulatory and in stable condition.    Hodan Landon PA-C ....................  4/22/2025   2:35 PM